# Patient Record
Sex: MALE
[De-identification: names, ages, dates, MRNs, and addresses within clinical notes are randomized per-mention and may not be internally consistent; named-entity substitution may affect disease eponyms.]

---

## 2018-07-05 ENCOUNTER — HOSPITAL ENCOUNTER (OUTPATIENT)
Dept: HOSPITAL 5 - XRAY | Age: 58
Discharge: HOME | End: 2018-07-05
Attending: ORTHOPAEDIC SURGERY
Payer: COMMERCIAL
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
## 2018-07-05 DIAGNOSIS — S62.301A: Primary | ICD-10-CM
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
## 2018-07-05 NOTE — XRAY REPORT
LEFT HAND, 3 views:



History: Left hand pain.



No comparison. Bone mineralization is borderline. A mildly comminuted 

fracture is identified through the second metacarpal neck. There is no 

obvious extension of fracture lines to the joint space. No calcified 

callus is identified suggesting a relatively acute injury. Moderate 

osteoarthritic changes are noted at the base of the thumb.



IMPRESSION:

Fracture, second metacarpal.

## 2021-08-10 ENCOUNTER — HOSPITAL ENCOUNTER (EMERGENCY)
Dept: HOSPITAL 5 - ED | Age: 61
Discharge: HOME | End: 2021-08-10
Payer: COMMERCIAL

## 2021-08-10 VITALS — DIASTOLIC BLOOD PRESSURE: 90 MMHG | SYSTOLIC BLOOD PRESSURE: 146 MMHG

## 2021-08-10 DIAGNOSIS — S01.01XD: ICD-10-CM
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
## 2021-08-10 DIAGNOSIS — Z48.02: Primary | ICD-10-CM

## 2021-08-10 NOTE — EMERGENCY DEPARTMENT REPORT
ED General Adult HPI





- General


Chief complaint: Laceration/Recheck/Suture


Stated complaint: SUTURE REMOVAL IN HEAD


Time Seen by Provider: 08/10/21 10:16


Source: patient


Mode of arrival: Ambulatory


Limitations: No Limitations





- History of Present Illness


Initial comments: 





60-year-old  male patient presents for staple removal today.  Patient 

states he had the staples placed at [facility] approximately 7 to 8 days 

ago after head injury.  He denies any fever/chills/sweats, purulent drainage, 

swelling to the area, or increased pain.  Patient states he is feeling well.





- Related Data


                                  Previous Rx's











 Medication  Instructions  Recorded  Last Taken  Type


 


Ibuprofen [Motrin 800 MG tab] 800 mg PO Q8HR PRN #30 tablet 09/02/15 Unknown Rx


 


cephALEXin [Keflex] 250 mg PO Q6HR #28 capsule 09/02/15 Unknown Rx


 


HYDROcodone/APAP 5-325 [[place] 1 each PO Q6HR PRN #20 tablet 01/19/18 Unknown Rx





5/325]    


 


Naproxen 500 mg PO BID PRN #30 tablet 01/19/18 Unknown Rx











                                    Allergies











Allergy/AdvReac Type Severity Reaction Status Date / Time


 


No Known Allergies Allergy   Unverified 09/02/15 17:33














ED Review of Systems


ROS: 


Stated complaint: SUTURE REMOVAL IN HEAD


Other details as noted in HPI





Constitutional: denies: chills, diaphoresis, fever, malaise, weakness


Skin: denies: rash, change in color


Neurological: denies: headache





ED Past Medical Hx





- Past Medical History


Previous Medical History?: No





- Surgical History


Past Surgical History?: No





- Social History


Smoking Status: Never Smoker


Substance Use Type: Alcohol





- Medications


Home Medications: 


                                Home Medications











 Medication  Instructions  Recorded  Confirmed  Last Taken  Type


 


Ibuprofen [Motrin 800 MG tab] 800 mg PO Q8HR PRN #30 tablet 09/02/15  Unknown Rx


 


cephALEXin [Keflex] 250 mg PO Q6HR #28 capsule 09/02/15  Unknown Rx


 


HYDROcodone/APAP 5-325 [[place] 1 each PO Q6HR PRN #20 tablet 01/19/18  Unknown Rx





5/325]     


 


Naproxen 500 mg PO BID PRN #30 tablet 01/19/18  Unknown Rx














ED Physical Exam





- General


Limitations: No Limitations


General appearance: alert, in no apparent distress





- Head


Head exam: Present: normocephalic, other.  Absent: atraumatic (Healing 

laceration with large scabbed over area noted to left occipital region of scalp;

7 staples noted in place no surrounding erythema or purulent drainage noted;)





- Eye


Eye exam: Present: normal appearance





- Neck


Neck exam: Absent: tenderness





- Respiratory


Respiratory exam: Absent: respiratory distress





- Cardiovascular


Cardiovascular Exam: Present: regular rate





- Neurological Exam


Neurological exam: Present: alert, oriented X3





- Psychiatric


Psychiatric exam: Present: normal affect, normal mood





- Skin


Skin exam: Present: warm, dry, normal color.  Absent: rash





ED Course





                                   Vital Signs











  08/10/21





  10:03


 


Temperature 98 F


 


Pulse Rate 67


 


Respiratory 16





Rate 


 


Blood Pressure 146/90





[Right] 


 


O2 Sat by Pulse 98





Oximetry 














- Procedure Description


Procedures done: 7 staples removed from posterior scalp.  Minimal bleeding 

occurred.  No purulent drainage or wound dehiscence noted.  No cellulitic 

changes noted.  Patient tolerated procedure well without any immediate 

complications.





ED Medical Decision Making





- Medical Decision Making








60-year-old  male patient presents for staple removal today.  Patient 

states he had the staples placed at [facility] approximately 7 to 8 days 

ago after head injury.  He denies any fever/chills/sweats, purulent drainage, 

swelling to the area, or increased pain.  Patient states he is feeling well.








Staples removed without any immediate complications.  Patient tolerated 

procedure well.  Discussed continued wound care and signs and symptoms that 

should prompt immediate return to the emergency department in detail with 

patient verbalized understanding.  He is well-appearing and stable for discharge

 home.  Patient to follow-up with his primary care provider in 3 to 5 days.


Critical care attestation.: 


If time is entered above; I have spent that time in minutes in the direct care 

of this critically ill patient, excluding procedure time.








ED Disposition


Clinical Impression: 


 Removal of staples





Disposition: DC-01 TO HOME OR SELFCARE


Is pt being admited?: No


Condition: Stable


Instructions:  Wound Closure Removal, Care After


Referrals: 


PRIMARY CARE,MD [Referring] - 3-5 Days